# Patient Record
Sex: FEMALE | Race: OTHER | URBAN - NONMETROPOLITAN AREA
[De-identification: names, ages, dates, MRNs, and addresses within clinical notes are randomized per-mention and may not be internally consistent; named-entity substitution may affect disease eponyms.]

---

## 2021-11-17 NOTE — PATIENT DISCUSSION
Non LVC 2' thin pach. Pt is a candidate for ICL; unsure if she wishes to proceed. Call to schedule PRN.

## 2023-04-17 ENCOUNTER — CONSULTATION/EVALUATION (OUTPATIENT)
Facility: LOCATION | Age: 78
End: 2023-04-17

## 2023-04-17 DIAGNOSIS — E11.9: ICD-10-CM

## 2023-04-17 DIAGNOSIS — H25.813: ICD-10-CM

## 2023-04-17 PROCEDURE — 99204 OFFICE O/P NEW MOD 45 MIN: CPT

## 2023-04-17 PROCEDURE — 92136 OPHTHALMIC BIOMETRY: CPT

## 2023-04-17 ASSESSMENT — VISUAL ACUITY
OS_SC: J1-2
OD_SC: 20/30-1
OS_BAT: 20/80
OD_SC: J1+-2
OD_BAT: 20/60
OU_SC: 20/30+2
OU_SC: J1+
OS_SC: 20/30

## 2023-04-17 ASSESSMENT — TONOMETRY
OD_IOP_MMHG: 9
OS_IOP_MMHG: 10

## 2023-09-22 ENCOUNTER — SURGERY/PROCEDURE (OUTPATIENT)
Facility: LOCATION | Age: 78
End: 2023-09-22

## 2023-09-22 DIAGNOSIS — H25.811: ICD-10-CM

## 2023-09-22 PROCEDURE — 6698454 REMOVE CATARACT;INSERT LENS (SX ONLY)

## 2024-02-19 ENCOUNTER — CONSULTATION/EVALUATION (OUTPATIENT)
Facility: LOCATION | Age: 79
End: 2024-02-19

## 2024-02-19 VITALS — BODY MASS INDEX: 33.99 KG/M2 | WEIGHT: 180 LBS | HEIGHT: 61 IN

## 2024-02-19 DIAGNOSIS — H25.812: ICD-10-CM

## 2024-02-19 PROCEDURE — 99214 OFFICE O/P EST MOD 30 MIN: CPT

## 2024-02-19 PROCEDURE — 92136 OPHTHALMIC BIOMETRY: CPT

## 2024-02-19 ASSESSMENT — VISUAL ACUITY
OS_SC: 20/30-1
OD_BAT: 20/400
OS_BAT: 20/400
OU_CC: J1
OD_SC: J2
OS_SC: J2-1
OD_SC: 20/20-2
OD_CC: J2-2
OS_CC: J2-1
OU_SC: J1+
OU_SC: 20/20-2

## 2024-02-19 ASSESSMENT — TONOMETRY
OD_IOP_MMHG: 8
OS_IOP_MMHG: 10

## 2024-06-28 ENCOUNTER — SURGERY/PROCEDURE (OUTPATIENT)
Facility: LOCATION | Age: 79
End: 2024-06-28

## 2024-06-28 DIAGNOSIS — H25.812: ICD-10-CM

## 2024-06-28 PROCEDURE — 66984 XCAPSL CTRC RMVL W/O ECP: CPT | Mod: 54,LT
